# Patient Record
Sex: MALE | Race: WHITE | Employment: STUDENT | URBAN - METROPOLITAN AREA
[De-identification: names, ages, dates, MRNs, and addresses within clinical notes are randomized per-mention and may not be internally consistent; named-entity substitution may affect disease eponyms.]

---
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
## 2023-11-09 ENCOUNTER — HOSPITAL ENCOUNTER (OUTPATIENT)
Age: 23
Discharge: HOME | End: 2023-11-09
Payer: COMMERCIAL

## 2023-11-09 VITALS
HEART RATE: 96 BPM | TEMPERATURE: 98 F | SYSTOLIC BLOOD PRESSURE: 119 MMHG | BODY MASS INDEX: 25.18 KG/M2 | HEIGHT: 73 IN | RESPIRATION RATE: 18 BRPM | OXYGEN SATURATION: 97 % | DIASTOLIC BLOOD PRESSURE: 55 MMHG | WEIGHT: 190 LBS

## 2023-11-09 DIAGNOSIS — H61.23 BILATERAL IMPACTED CERUMEN: Primary | ICD-10-CM

## 2023-11-09 DIAGNOSIS — J06.9 VIRAL URI: ICD-10-CM

## 2023-11-09 PROCEDURE — 69209 REMOVE IMPACTED EAR WAX UNI: CPT | Performed by: PHYSICIAN ASSISTANT

## 2023-11-09 PROCEDURE — 99203 OFFICE O/P NEW LOW 30 MIN: CPT | Performed by: PHYSICIAN ASSISTANT

## 2023-11-09 RX ORDER — BENZONATATE 100 MG/1
100 CAPSULE ORAL 3 TIMES DAILY PRN
Qty: 15 CAPSULE | Refills: 0 | Status: SHIPPED | OUTPATIENT
Start: 2023-11-09

## 2023-11-09 RX ORDER — PREDNISONE 20 MG/1
40 TABLET ORAL DAILY
Qty: 10 TABLET | Refills: 0 | Status: SHIPPED | OUTPATIENT
Start: 2023-11-09 | End: 2023-11-14

## (undated) NOTE — ED AVS SNAPSHOT
[facility] Emergency Department    [place]  [address] [address]    Phone:  [phone]    Fax:  [phone]           Gloria Dominguezsafia   MRN: [id number]    Department:  [facility] Emergency Department   Date of Visit:  1/28/ IF THERE IS ANY CHANGE OR WORSENING OF YOUR CONDITION, CALL YOUR PRIMARY CARE PHYSICIAN AT ONCE OR RETURN IMMEDIATELY TO THE EMERGENCY DEPARTMENT.     If you have been prescribed any medication(s), please fill your prescription right away and begin taking t

## (undated) NOTE — LETTER
January 28, 2017    Patient: Petar Lara   Date of Visit: 1/28/2017       To Whom It May Concern:    Radha Miranda was seen and treated in our emergency department on 1/28/2017.  He may return to school with no contact sports or gym until cleared by leonela

## (undated) NOTE — ED AVS SNAPSHOT
[facility] Emergency Department    [place]  [address] [address]    Phone:  [phone]    Fax:  [phone]           Saurabh Munster   MRN: [id number]    Department:  [facility] Emergency Department   Date of Visit:  1/28/ To Check ER Wait Times:  TEXT 'ERwait' to 19885      Click [email or web address]      Or call [phone]    If you have any problems with your follow-up, please call our  at [phone]    Si usted tiene algun problema con pugh sequimiento, por f I have read and understand the instructions given to me by my caregivers. [facility]        Pharmacy Address Phone Number   [phone] [facility] ([address]) [address] [place] [address].  [facility] & Dictated by: Shelley Terry MD on 1/28/2017 at 23:17       Approved by: Shelley Terry MD              Narrative:    PROCEDURE:  XR CHEST PA + LAT CHEST (CPT=71020)     INDICATIONS:  CHEST PAIN     COMPARISON:  None.      TECHNIQUE:  PA and latera